# Patient Record
Sex: MALE | Race: BLACK OR AFRICAN AMERICAN | Employment: UNEMPLOYED
[De-identification: names, ages, dates, MRNs, and addresses within clinical notes are randomized per-mention and may not be internally consistent; named-entity substitution may affect disease eponyms.]

---
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
## 2025-06-27 ENCOUNTER — MEDICAL CORRESPONDENCE (OUTPATIENT)
Dept: HEALTH INFORMATION MANAGEMENT | Facility: CLINIC | Age: 25
End: 2025-06-27

## 2025-06-29 ENCOUNTER — HOSPITAL ENCOUNTER (EMERGENCY)
Facility: CLINIC | Age: 25
Discharge: HOME | End: 2025-06-29
Attending: EMERGENCY MEDICINE | Admitting: EMERGENCY MEDICINE

## 2025-06-29 VITALS
HEART RATE: 93 BPM | OXYGEN SATURATION: 99 % | SYSTOLIC BLOOD PRESSURE: 127 MMHG | DIASTOLIC BLOOD PRESSURE: 91 MMHG | TEMPERATURE: 97.5 F | RESPIRATION RATE: 23 BRPM

## 2025-06-29 DIAGNOSIS — F19.90 DRUG USE: ICD-10-CM

## 2025-06-29 DIAGNOSIS — F11.90 OPIOID USE: ICD-10-CM

## 2025-06-29 LAB
ALBUMIN SERPL BCG-MCNC: 4.2 G/DL (ref 3.5–5.2)
ALP SERPL-CCNC: 62 U/L (ref 40–150)
ALT SERPL W P-5'-P-CCNC: 14 U/L (ref 0–70)
ANION GAP SERPL CALCULATED.3IONS-SCNC: 9 MMOL/L (ref 7–15)
APAP SERPL-MCNC: <5 UG/ML (ref 10–30)
AST SERPL W P-5'-P-CCNC: 35 U/L (ref 0–45)
ATRIAL RATE - MUSE: 92 BPM
BASOPHILS # BLD AUTO: 0 10E3/UL (ref 0–0.2)
BASOPHILS NFR BLD AUTO: 1 %
BILIRUB DIRECT SERPL-MCNC: <0.08 MG/DL (ref 0–0.3)
BILIRUB SERPL-MCNC: 0.3 MG/DL
BUN SERPL-MCNC: 6.1 MG/DL (ref 6–20)
CALCIUM SERPL-MCNC: 9 MG/DL (ref 8.8–10.4)
CHLORIDE SERPL-SCNC: 102 MMOL/L (ref 98–107)
CREAT SERPL-MCNC: 0.7 MG/DL (ref 0.67–1.17)
DIASTOLIC BLOOD PRESSURE - MUSE: NORMAL MMHG
EGFRCR SERPLBLD CKD-EPI 2021: >90 ML/MIN/1.73M2
EOSINOPHIL # BLD AUTO: 0.2 10E3/UL (ref 0–0.7)
EOSINOPHIL NFR BLD AUTO: 3 %
ERYTHROCYTE [DISTWIDTH] IN BLOOD BY AUTOMATED COUNT: 12 % (ref 10–15)
ETHANOL SERPL-MCNC: <0.01 G/DL
GLUCOSE SERPL-MCNC: 93 MG/DL (ref 70–99)
HCO3 SERPL-SCNC: 29 MMOL/L (ref 22–29)
HCT VFR BLD AUTO: 41.1 % (ref 40–53)
HGB BLD-MCNC: 14.3 G/DL (ref 13.3–17.7)
HOLD SPECIMEN: NORMAL
HOLD SPECIMEN: NORMAL
IMM GRANULOCYTES # BLD: 0 10E3/UL
IMM GRANULOCYTES NFR BLD: 0 %
INTERPRETATION ECG - MUSE: NORMAL
LYMPHOCYTES # BLD AUTO: 2.9 10E3/UL (ref 0.8–5.3)
LYMPHOCYTES NFR BLD AUTO: 40 %
MCH RBC QN AUTO: 30.6 PG (ref 26.5–33)
MCHC RBC AUTO-ENTMCNC: 34.8 G/DL (ref 31.5–36.5)
MCV RBC AUTO: 88 FL (ref 78–100)
MONOCYTES # BLD AUTO: 0.7 10E3/UL (ref 0–1.3)
MONOCYTES NFR BLD AUTO: 10 %
NEUTROPHILS # BLD AUTO: 3.5 10E3/UL (ref 1.6–8.3)
NEUTROPHILS NFR BLD AUTO: 47 %
NRBC # BLD AUTO: 0 10E3/UL
NRBC BLD AUTO-RTO: 0 /100
P AXIS - MUSE: 35 DEGREES
PLATELET # BLD AUTO: 325 10E3/UL (ref 150–450)
POTASSIUM SERPL-SCNC: 4.2 MMOL/L (ref 3.4–5.3)
PR INTERVAL - MUSE: 144 MS
PROT SERPL-MCNC: 7.1 G/DL (ref 6.4–8.3)
QRS DURATION - MUSE: 92 MS
QT - MUSE: 344 MS
QTC - MUSE: 425 MS
R AXIS - MUSE: -1 DEGREES
RBC # BLD AUTO: 4.67 10E6/UL (ref 4.4–5.9)
SODIUM SERPL-SCNC: 140 MMOL/L (ref 135–145)
SYSTOLIC BLOOD PRESSURE - MUSE: NORMAL MMHG
T AXIS - MUSE: 21 DEGREES
VENTRICULAR RATE- MUSE: 92 BPM
WBC # BLD AUTO: 7.4 10E3/UL (ref 4–11)

## 2025-06-29 PROCEDURE — 82248 BILIRUBIN DIRECT: CPT | Performed by: EMERGENCY MEDICINE

## 2025-06-29 PROCEDURE — 36415 COLL VENOUS BLD VENIPUNCTURE: CPT | Performed by: EMERGENCY MEDICINE

## 2025-06-29 PROCEDURE — 80143 DRUG ASSAY ACETAMINOPHEN: CPT | Performed by: EMERGENCY MEDICINE

## 2025-06-29 PROCEDURE — 80048 BASIC METABOLIC PNL TOTAL CA: CPT | Performed by: EMERGENCY MEDICINE

## 2025-06-29 PROCEDURE — 93005 ELECTROCARDIOGRAM TRACING: CPT

## 2025-06-29 PROCEDURE — 82077 ASSAY SPEC XCP UR&BREATH IA: CPT | Performed by: EMERGENCY MEDICINE

## 2025-06-29 PROCEDURE — 99284 EMERGENCY DEPT VISIT MOD MDM: CPT

## 2025-06-29 PROCEDURE — 85004 AUTOMATED DIFF WBC COUNT: CPT | Performed by: EMERGENCY MEDICINE

## 2025-06-29 RX ORDER — NALOXONE HYDROCHLORIDE 1 MG/ML
1 INJECTION INTRAMUSCULAR; INTRAVENOUS; SUBCUTANEOUS ONCE
Status: COMPLETED | OUTPATIENT
Start: 2025-06-29 | End: 2025-06-29

## 2025-06-29 RX ORDER — BUPRENORPHINE AND NALOXONE 8; 2 MG/1; MG/1
1 FILM, SOLUBLE BUCCAL; SUBLINGUAL 2 TIMES DAILY PRN
Qty: 21 FILM | Refills: 0 | Status: SHIPPED | OUTPATIENT
Start: 2025-06-29

## 2025-06-29 ASSESSMENT — ACTIVITIES OF DAILY LIVING (ADL)
ADLS_ACUITY_SCORE: 41
ADLS_ACUITY_SCORE: 41

## 2025-06-29 ASSESSMENT — COLUMBIA-SUICIDE SEVERITY RATING SCALE - C-SSRS
1. IN THE PAST MONTH, HAVE YOU WISHED YOU WERE DEAD OR WISHED YOU COULD GO TO SLEEP AND NOT WAKE UP?: NO
6. HAVE YOU EVER DONE ANYTHING, STARTED TO DO ANYTHING, OR PREPARED TO DO ANYTHING TO END YOUR LIFE?: NO
2. HAVE YOU ACTUALLY HAD ANY THOUGHTS OF KILLING YOURSELF IN THE PAST MONTH?: NO

## 2025-06-29 NOTE — ED TRIAGE NOTES
"Patient BIBA for consumption of OP 80s and Xanax. Patient consumed the medications between 9pm (06/28/2025) and midnight (06/29/2025). His brother saw the patient being less responsive and foaming at the mouth, thinking the patient was having a seizure (pt does not have a history of seizures). EMS was called due to his change in mentation. When asked why the patient took between 180/240mg of the OP 80s and 3/4 Xanax, the patient stated, \"I just wanted to get out of my head.\"     Triage Assessment (Adult)       Row Name 06/29/25 0534          Triage Assessment    Airway WDL WDL        Respiratory WDL    Respiratory WDL WDL        Skin Circulation/Temperature WDL    Skin Circulation/Temperature WDL WDL        Cardiac WDL    Cardiac WDL WDL     Cardiac Rhythm NSR        Peripheral/Neurovascular WDL    Peripheral Neurovascular WDL WDL        Cognitive/Neuro/Behavioral WDL    Cognitive/Neuro/Behavioral WDL arousability;level of consciousness     Level of Consciousness lethargic     Arousal Level arouses to repeated stimulation                     "

## 2025-06-29 NOTE — ED NOTES
Bed: ED22  Expected date:   Expected time:   Means of arrival:   Comments:  H438 24M AMS polysubstance abuse, transport hold

## 2025-06-29 NOTE — DISCHARGE INSTRUCTIONS
Discharge Instructions  Home Induction of Buprenorphine for Opiate Use Disorder    Before you begin you want to feel very sick from your withdrawal symptoms   Opiate Use Disorder (OUD) is addiction to any opiate (heroin, pain pills, etc.). Addiction is a chronic disease in which a person unable to stop or moderate their use and it causes them problems. Buprenorphine is a medication that is well studied for the treatment of OUD. It blocks cravings and withdrawal symptoms. It does not produce a  high  and will prevent a  high  if you use opiates. Buprenorphine is one medication used in  [facility]ication [facility] Treatment ; there are other components of treatment that might be important including counseling and therapy.    Buprenorphine (Suboxone ) is unique in that it should not be swallowed. The medication, whether a tablet or a film, should dissolve in your mouth after you place it under your tongue. It works quickly and you should feel some effects within minutes. Most patients can find a comfortable daily dose within just a day or two.    If you take Buprenorphine while you are  high , it will block the opiates you have in your body and it will cause withdrawal, this is called  precipitated withdrawal . This is why it is important to ensure you are in withdrawal before starting Buprenorphine.    It should be at least 12 hours since last use, may need to be longer than this depending on what you use:   At least 12-16 hours since you swallowed pain pills (ex. Oxycodone)   At least 24 hours since fentanyl if chronic user or Oxycontin   At least 36 hours since methadone   You should feel at least three of these symptoms before you start buprenorphine:   Very restless, can't sit still   Heavy yawning   Enlarged pupils   Runny nose, tears in your eyes   Joint and bone aches   Tremors/twitching or shaking   Bad chills or sweating   Anxious or irritable   Goose pimples/bumps   Stomach cramps, nausea/urge to throw up, throwing  up/vomiting or diarrhea/loose stool   Once you are ready, follow these instructions to start the medication:  Day 1:    Take one or two 8 mg sublingual tablets or strips under your tongue (8-16 mg). If you are a heavy user of fentanyl, 16 mg for the starting dose may be needed.   If still feeling bad 30-60 minutes later, take an additional one tablet or strip (8 mg). May repeat up until a maximum dose of 24-32 mg per day. You may only need high doses for the first day.   Day 2:    Take 8-16 mg in morning (1-2 tablets or strips) at one time   You may need to take an additional 1-2 tablets or strips later in the day depending on how you are feeling. Maximum dose of 24-32 mg per day   Continue this every day until you have your follow-up visit. Many patients want to take this medication twice per day for first few weeks instead of once a day.    Tips:   Don't start buprenorphine too soon!   Start with a moist mouth, avoid acidic drinks (coffee or fruit juice)   Avoid using nicotine products right before or after taking medication as this decreases absorption   Keep dissolving medicine under tongue and avoid talking while taking medication. After the medication is dissolved, wait several minutes before swallowing or spitting the remaining saliva in your mouth. Swallowing the medication can make you nauseous   [facility] is a good resource   Using other substances makes starting buprenorphine harder. Mixing alcohol and benzodiazepines with buprenorphine can be dangerous. Too much buprenorphine can make you feel sick and sleepy.   Warning: Buprenorphine can be very dangerous in small children. Please keep buprenorphine away from children.     Return to the Emergency Department if:  If you develop worsening symptoms while starting buprenorphine before your clinic appointment  Follow-up with your provider:  We will provide you only a short prescription, you will need to follow-up with a clinic to obtain further  medication    If you were given a prescription for medicine here today, be sure to read all of the information (including the package insert) that comes with your prescription.  This will include important information about the medicine, its side effects, and any warnings that you need to know about.  The pharmacist who fills the prescription can provide more information and answer questions you may have about the medicine.  If you have questions or concerns that the pharmacist cannot address, please call or return to the Emergency Department.    Remember that you can always come back to the Emergency Department if you are not able to see your regular provider in the amount of time listed above, if you get any new symptoms, or if there is anything that worries you.

## 2025-06-29 NOTE — ED PROVIDER NOTES
"  Emergency Department Note      History of Present Illness     Chief Complaint   Ingestion      HPI   José Miguel Painter is a 24 year old male who presents for evaluation after ingestion of drugs. Patient reports that between 2100 and 0000 last night he first ingested 3 mg xanax and later 180-240 mg OP80s as an attempt to cope with recent school and life stress to get out of his head. The \"OP80s\"  was reportedly a friend's prescription and so he is not concerned about it being tampered with, however, he notes that the xanax he took was from the street and he believes may be laced and causing his discomfort and lightheadedness in the ED. He uses Op80s regularly, around 3-4 times monthly, but has not used xanax since April 3 months ago. Patient states that last week he had tried to stop taking the OPs but quickly went into withdrawal with nausea, and vomiting, and so he began taking the drug again soon after leading to today's event. Patient denies experiencing any suicidal ideation.     Independent Historian   None    Review of External Notes   None     Past Medical History     Medical History and Problem List   Asthma  Herpes     Medications   Flovent   Beclomethasone   Valtrex     Surgical History   None on file     Physical Exam     Patient Vitals for the past 24 hrs:   BP Temp Temp src Pulse Resp SpO2   06/29/25 0554 113/80 -- -- 94 19 98 %   06/29/25 0553 113/80 -- -- 96 13 98 %   06/29/25 0508 -- 97.5  F (36.4  C) Oral -- -- --     Physical Exam  General: Sitting up in bed  Eyes:  The pupils are equal and round    Conjunctivae and sclerae are normal  ENT:    Atraumatic face  Neck:  Normal range of motion  CV:  Regular rate,  regular rhythm     Skin warm and well perfused   Resp:  Non labored breathing on room air    No tachypnea    No cough heard    Lungs clear bilaterally  GI:  Abdomen is soft, there is no rigidity    No distension    No rebound tenderness     No abdominal tenderness  MS:  Normal muscular " tone  Skin:  No rash or acute skin lesions noted  Neuro:   Awake, mildly drowsy but able to stay awake during conversation    Speech is normal and fluent.    Face is symmetric.     Moves all extremities equally  Psych: Normal affect.  Appropriate interactions.    Diagnostics     Lab Results   Labs Ordered and Resulted from Time of ED Arrival to Time of ED Departure   ACETAMINOPHEN LEVEL - Abnormal       Result Value    Acetaminophen <5.0 (*)    BASIC METABOLIC PANEL - Normal    Sodium 140      Potassium 4.2      Chloride 102      Carbon Dioxide (CO2) 29      Anion Gap 9      Urea Nitrogen 6.1      Creatinine 0.70      GFR Estimate >90      Calcium 9.0      Glucose 93     ETHANOL LEVEL BLOOD - Normal    Ethanol Level Blood <0.01     HEPATIC FUNCTION PANEL - Normal    Protein Total 7.1      Albumin 4.2      Bilirubin Total 0.3      Alkaline Phosphatase 62      AST 35      ALT 14      Bilirubin Direct <0.08     CBC WITH PLATELETS AND DIFFERENTIAL    WBC Count 7.4      RBC Count 4.67      Hemoglobin 14.3      Hematocrit 41.1      MCV 88      MCH 30.6      MCHC 34.8      RDW 12.0      Platelet Count 325      % Neutrophils 47      % Lymphocytes 40      % Monocytes 10      % Eosinophils 3      % Basophils 1      % Immature Granulocytes 0      NRBCs per 100 WBC 0      Absolute Neutrophils 3.5      Absolute Lymphocytes 2.9      Absolute Monocytes 0.7      Absolute Eosinophils 0.2      Absolute Basophils 0.0      Absolute Immature Granulocytes 0.0      Absolute NRBCs 0.0       EKG   ECG results from 06/29/25   EKG 12 lead     Value    Systolic Blood Pressure     Diastolic Blood Pressure     Ventricular Rate 92    Atrial Rate 92    KS Interval 144    QRS Duration 92        QTc 425    P Axis 35    R AXIS -1    T Axis 21    Interpretation ECG      Sinus rhythm  Minimal voltage criteria for LVH, may be normal variant ( R in aVL )    Read by Jo Ann Tineo MD at 0535       Independent Interpretation   None    ED Course       Medications Administered   Medications   naloxone (NARCAN) injection 1 mg (1 mg Intravenous Not Given 6/29/25 0705)   sodium chloride 0.9% BOLUS 1,000 mL (1,000 mLs Intravenous Not Given 6/29/25 0706)     Procedures   Procedures     Discussion of Management   Poison control    ED Course   ED Course as of 06/29/25 0708   [place] Jun 29, 2025   2399 I obtained patient history and performed a physical exam.    0611 I spoke with poison control.      Medical Decision Making / Diagnosis       ROBBIE   José Miguel Painter is a 24 year old male who presented to the emergency department with ingestion.  Patient with ingestion of Xanax and what he reports is extended release oxycodone that was prescribed to a friend.  He denies suicidal ideation.  Has been using drugs for a while now.  Drowsy in the emergency department but stays awake during exam.  Did not require Narcan.  Based on timing of when he took the medication which was between 9 PM to midnight, Poison control thought that the peak should already be passed.  Recommendation was to monitor patient in the emergency department for few hours.  Patient is not willing to stay in the emergency department the recommended amount of time as he reports he needs to go to work as well as his friend needs to work.  Patient does seem more alert and awake on my reexamination so given that he is with a friend and I discussed the risk of leaving early with them, will discharge him.  Discussed Suboxone use with him which he was in agreement with as well as Narcan.  Recommend follow-up with recovery clinic.    Disposition   The patient was discharged.     Diagnosis     ICD-10-CM    1. Opioid use  F11.90       2. Drug use  F19.90            Discharge Medications   New Prescriptions    BUPRENORPHINE HCL-NALOXONE HCL (SUBOXONE) 8-2 MG PER FILM    Place 1 Film under the tongue 2 times daily as needed.    NALOXONE (NARCAN) 4 MG/0.1ML NASAL SPRAY    Spray 1 spray (4 mg) into one nostril  alternating nostrils as needed for opioid reversal. every 2-3 minutes until assistance arrives         Scribe Disclosure:  I, Loni Flanagan, am serving as a scribe at 5:37 AM on 6/29/2025 to document services personally performed by Jo Ann Tineo MD based on my observations and the provider's statements to me.          Jo Ann Tineo MD  06/29/25 0789